# Patient Record
Sex: FEMALE | Race: WHITE | NOT HISPANIC OR LATINO | Employment: UNEMPLOYED | URBAN - METROPOLITAN AREA
[De-identification: names, ages, dates, MRNs, and addresses within clinical notes are randomized per-mention and may not be internally consistent; named-entity substitution may affect disease eponyms.]

---

## 2022-03-04 PROBLEM — F90.0 ATTENTION DEFICIT HYPERACTIVITY DISORDER, PREDOMINANTLY INATTENTIVE TYPE: Status: ACTIVE | Noted: 2022-03-04

## 2022-04-04 ENCOUNTER — GYNECOLOGY VISIT (OUTPATIENT)
Dept: OBGYN | Facility: CLINIC | Age: 22
End: 2022-04-04
Payer: MEDICAID

## 2022-04-04 ENCOUNTER — HOSPITAL ENCOUNTER (OUTPATIENT)
Facility: MEDICAL CENTER | Age: 22
End: 2022-04-04
Attending: NURSE PRACTITIONER
Payer: MEDICAID

## 2022-04-04 VITALS — DIASTOLIC BLOOD PRESSURE: 65 MMHG | BODY MASS INDEX: 26.46 KG/M2 | SYSTOLIC BLOOD PRESSURE: 122 MMHG | WEIGHT: 174 LBS

## 2022-04-04 DIAGNOSIS — Z30.2 REQUEST FOR STERILIZATION: ICD-10-CM

## 2022-04-04 DIAGNOSIS — Z01.419 WELL WOMAN EXAM: ICD-10-CM

## 2022-04-04 PROCEDURE — 87591 N.GONORRHOEAE DNA AMP PROB: CPT

## 2022-04-04 PROCEDURE — 99395 PREV VISIT EST AGE 18-39: CPT | Performed by: NURSE PRACTITIONER

## 2022-04-04 PROCEDURE — 88175 CYTOPATH C/V AUTO FLUID REDO: CPT

## 2022-04-04 PROCEDURE — 87491 CHLMYD TRACH DNA AMP PROBE: CPT

## 2022-04-04 ASSESSMENT — FIBROSIS 4 INDEX: FIB4 SCORE: 0.33

## 2022-04-04 NOTE — PROGRESS NOTES
Ami Valle is a 21 y.o. y.o. female who presents for her annual gynecological exam.       HPI Comments: Pt reports no issues at this time. She is wanting to establish care and also discuss permanent sterilization. Reports starting at age 13 she knew she did not want to have kids and she is still sure of this.     Review of Systems:  Cardio: Denies any issues.   Respiratory: Denies any issues.   Constitutional:  Denies any issues.   : [place] any issues.   Abdominal: Denies any issues.   Psychosocial: Denies any issues.   EENT: Denies any issues.   Metabolic: Denies any issues.   Pertinent positives documented in HPI and all other systems reviewed & are negative.     Gynecological hx:   Last pap was not done due to age.     Denies any hx of STIs and has not been tested for STIs before.     Patient's last menstrual period was 03/12/2022.. Reports has regular periods every 28 days lasting 4-5 days and started menstruating at age 10.     Currently sexually active with 1 sexual partner who identifies as a man. She has had a total of 1 sexual partners in lifetime. She reports a longer hx of decreased/absent libido and thinks that historically could have been related to the SSRIs she was on but she has been off of these for about a year and the issue has continued.     Reports does use birth control: condoms. Does not desire a pregnancy at this time and would like to either have surgery for sterilization or use a LARC. She use condoms for safe sex methods.     Denies any history of breast issues, surgeries, cancer.     OB Hx:  - n/a    Reports she is taking Vyanse for ADHD and has a mental health provider that she is well-established with. Reports a hx of anxiety and depression and PMDD. Denies any psychological hx including hospitalizations and psych medication.   Denies any hx of or current issues with interpersonal violence.   Denies any use of tobacco, or other drugs. Using marijuana nightly for sleep. She  drinks alcohol a few times a week and cigars on occasion.       All PMH, PSH, allergies, social history and FH reviewed and updated today:  Past Medical History:   Diagnosis Date   • Anxiety    • Depression    • GERD (gastroesophageal reflux disease)    • Patulous eustachian tube    • Suicidal ideation      History reviewed. No pertinent surgical history.  Zoloft  Social History     Socioeconomic History   • Marital status: Single   Tobacco Use   • Smoking status: Never Smoker   • Smokeless tobacco: Never Used   Vaping Use   • Vaping Use: Never used   Substance and Sexual Activity   • Alcohol use: Yes   • Drug use: Yes     Types: Marijuana   • Sexual activity: Yes     Birth control/protection: None     No family history on file.  Medications:   Current Outpatient Medications Ordered in Epic   Medication Sig Dispense Refill   • ibuprofen (MOTRIN) 800 MG Tab Take 800 mg by mouth every 8 hours as needed.     • Lisdexamfetamine Dimesylate (VYVANSE) 40 MG Cap Take  by mouth.     • multivitamin (THERAGRAN) Tab Take 1 Tablet by mouth every day.     • Omega-3 Fatty Acids (FISH OIL) 1000 MG Cap capsule Take 1,000 mg by mouth 3 times a day with meals.       No current Epic-ordered facility-administered medications on file.          Objective:   Vital measurements:  /65 (BP Location: Right arm, Patient Position: Sitting, BP Cuff Size: Small adult)   Wt 78.9 kg (174 lb)   Body mass index is 26.46 kg/m². (Goal BM I>18 <25)    Physical Exam   Nursing note and vitals reviewed.    Constitutional: She is oriented to person, place, and time. She appears well-developed and well-nourished. No distress.     HEENT:   Head: Normocephalic and atraumatic.   Right Ear: External ear normal.   Left Ear: External ear normal.   Nose: Nose normal.   Eyes: Conjunctivae and EOM are normal. Pupils are equal, round, and reactive to light. No scleral icterus.     Neck: Normal range of motion. Neck supple. No tracheal deviation present. No  thyromegaly present.     Pulmonary/Chest: Effort normal and breath sounds normal. No respiratory distress. She has no wheezes. She has no rales. She exhibits no tenderness.     Cardiovascular: Regular, rate and rhythm. No JVD.    Abdominal: Soft. Bowel sounds are normal. She exhibits no distension and no mass. No tenderness. She has no rebound and no guarding.     Breast:  Symmetrical, normal consistency without masses., No dimpling or skin changes    Genitourinary:  Pelvic exam was performed with patient supine.  External genitalia with no abnormal pigmentation, labial fusion,rash, tenderness, lesion or injury to the labia bilaterally.  Vagina is moist with no lesions, foul discharge, erythema, tenderness or bleeding. No foreign body around the vagina or signs of injury.   Cervix exhibits no motion tenderness, no discharge and no friability.     Musculoskeletal: Normal range of motion. She exhibits no edema and no tenderness.     Lymphadenopathy: She has no cervical adenopathy.     Neurological: She is alert and oriented to person, place, and time. She exhibits normal muscle tone.     Skin: Skin is warm and dry. No rash noted. She is not diaphoretic. No erythema. No pallor.     Psychiatric: She has a normal mood and affect. Her behavior is normal. Judgment and thought content normal.      Assessment:     Well woman exam     Plan:   Pap and physical exam performed  STI screening via blood also accepted today  Monthly self breast exam education provided  HPV vaccine candidate: pt accepts but wants to come back to start the series   Pt would like to speak with a surgeon about option for sterilization; I have reviewed that due to her young age they may be very hesitant to perform this surgery but she can consult and discuss options, she has also thought about the IUDs but is debating non-hormonal v. Hormonal due to chronic mental health issues and not wanting to exacerbate this; I have reviewed these two IUD options  with her today   Pt reports she does have a PCP she established with recently   Return to clinic: for BTL consult   Will plan for consult for chronic decreased libido pending her choice for birth control

## 2022-04-05 DIAGNOSIS — Z01.419 WELL WOMAN EXAM: ICD-10-CM

## 2022-04-05 LAB
C TRACH DNA GENITAL QL NAA+PROBE: NEGATIVE
CYTOLOGY REG CYTOL: NORMAL
N GONORRHOEA DNA GENITAL QL NAA+PROBE: NEGATIVE
SPECIMEN SOURCE: NORMAL

## 2022-06-29 ENCOUNTER — TELEMEDICINE (OUTPATIENT)
Dept: OBGYN | Facility: CLINIC | Age: 22
End: 2022-06-29
Payer: MEDICAID

## 2022-06-29 DIAGNOSIS — Z30.09 GENERAL COUNSELING AND ADVICE FOR CONTRACEPTIVE MANAGEMENT: ICD-10-CM

## 2022-06-29 DIAGNOSIS — Z01.818 TUBAL LIGATION EVALUATION: ICD-10-CM

## 2022-06-29 PROCEDURE — 99443 PR PHYSICIAN TELEPHONE EVALUATION 21-30 MIN: CPT | Performed by: OBSTETRICS & GYNECOLOGY

## 2022-06-29 NOTE — PROGRESS NOTES
No chief complaint on file.    Pt is aware this is a teleheath appt and agrees to continue the appointment over the phone.        History of present illness: 21 y.o.  presents for a telemedicine appointment for permanent sterilization. Pt has known for about 7-8 years that she does not want to have children. She reports a lot of medical conditions and genetic issues (substance abuse, depression, mental illness) is is worried about raising a child. Pt thinks she may also have some form of autism or may be on the spectrum.     Pt is sexually active. Pt is currently using condoms for BC. Partner is considering vasectomy. Pt has never used another form of BC. She is worried about hormonal birth control leading to mood changes and unstability. Pt also thinks she has PMDS but has never tried treatment for this. She has been on/off SSRIs in the past but she is trying to avoid medications if possible.     Review of systems:  Pertinent positives documented in HPI and a comprehensive review of system is negative    All PMH, PSH, allergies, social history and FH reviewed and updated today:    Physical exam: There were no vitals filed for this visit.   Telemedicine appt, no physical exam completed.       Assessment: 22 yo G0 presents for a telemedicine appt to discuss permanent sterilization    Plan:  Discussed with pt that should she desire BTL we can do this, it is her right. We discussed the risk of major abdominal surgery, pt is aware this cannot be reversed as we would plan to take the entirity of both tubes. We also discussed the risk of regret.     We also reviewed BC options, LARCs, that are as effective and BTL but reversible. We reviewed the added benefits of the Mirena IUD and Nexplanon.     WE discussed how BC can at times be used for PMS/PMDS symptoms.     At the end of our conversation all of the patients questions were answered and the pt will further consider the Mirena IUD. Pt can call the office to make an  appt should she decide to go this route. She will also need to make an appt for surgery planning if she desires BTL.     Spent  30 minutes on the phone with direct patient contact in which greater than 50% of that visit was spent in counseling/coordination of care including medical and surgical options of care.